# Patient Record
Sex: MALE | Race: WHITE | Employment: OTHER | URBAN - METROPOLITAN AREA
[De-identification: names, ages, dates, MRNs, and addresses within clinical notes are randomized per-mention and may not be internally consistent; named-entity substitution may affect disease eponyms.]

---

## 2020-09-14 ENCOUNTER — ANCILLARY PROCEDURE (OUTPATIENT)
Dept: GENERAL RADIOLOGY | Facility: CLINIC | Age: 65
End: 2020-09-14
Attending: PEDIATRICS
Payer: COMMERCIAL

## 2020-09-14 ENCOUNTER — OFFICE VISIT (OUTPATIENT)
Dept: ORTHOPEDICS | Facility: CLINIC | Age: 65
End: 2020-09-14
Payer: COMMERCIAL

## 2020-09-14 VITALS
WEIGHT: 196 LBS | BODY MASS INDEX: 28.06 KG/M2 | HEIGHT: 70 IN | SYSTOLIC BLOOD PRESSURE: 138 MMHG | DIASTOLIC BLOOD PRESSURE: 68 MMHG

## 2020-09-14 DIAGNOSIS — M77.01 MEDIAL EPICONDYLITIS OF RIGHT ELBOW: ICD-10-CM

## 2020-09-14 DIAGNOSIS — M25.521 RIGHT ELBOW PAIN: ICD-10-CM

## 2020-09-14 DIAGNOSIS — M19.021 PRIMARY OSTEOARTHRITIS OF RIGHT ELBOW: ICD-10-CM

## 2020-09-14 DIAGNOSIS — M25.521 RIGHT ELBOW PAIN: Primary | ICD-10-CM

## 2020-09-14 PROCEDURE — 73080 X-RAY EXAM OF ELBOW: CPT | Mod: RT

## 2020-09-14 PROCEDURE — 99204 OFFICE O/P NEW MOD 45 MIN: CPT | Performed by: PEDIATRICS

## 2020-09-14 RX ORDER — SIMVASTATIN 10 MG
10 TABLET ORAL AT BEDTIME
COMMUNITY

## 2020-09-14 RX ORDER — LISINOPRIL 10 MG/1
10 TABLET ORAL DAILY
COMMUNITY

## 2020-09-14 ASSESSMENT — MIFFLIN-ST. JEOR: SCORE: 1675.3

## 2020-09-14 NOTE — PROGRESS NOTES
Sports Medicine Clinic Visit    PCP: Andrei Callejas Di is a 64 year old male who is seen  as a self referral presenting with right medial elbow pain.  Pain has been present for 4-5 months and pain has been increasing.  Pain increased with racquet and pickle ball.    Pain increased last night.  Pain even with rolling over in bed.  This morning, it has improved.    Did arm wrestle a week prior to this.    He is right hand dominant.    Has noticed numbness in his small finger     Injury: gradual onset  **  A lot of racquet sports in past. Also physical labor.  Pain medial elbow, points at medial epicondyle.        Location of Pain: right medial elbow   Duration of Pain: 4-5 month(s)  Rating of Pain at worst: 5/10  Rating of Pain Currently: 0/10  Symptoms are better with: Rest  Symptoms are worse with: racquet sports   Additional Features:   Positive: paresthesias and numbness   Negative: swelling, bruising, popping, grinding, catching, locking, instability, weakness, pain in other joints and systemic symptoms  Other evaluation and/or treatments so far consists of: Nothing  Prior History of related problems: denies     Social History: retired, former assembly     Review of Systems  Musculoskeletal: as above  Remainder of review of systems is negative including constitutional, CV, pulmonary, GI, Skin and Neurologic except as noted in HPI or medical history.    Past Medical History:   Diagnosis Date     Amyloidosis (H)      Hyperlipidemia      Hypertension      Past Surgical History:   Procedure Laterality Date     BMT CELL PRODUCT INFUSION       Fam hx: noncontributory    Social History     Socioeconomic History     Marital status:      Spouse name: Not on file     Number of children: Not on file     Years of education: Not on file     Highest education level: Not on file   Occupational History     Not on file   Social Needs     Financial resource strain: Not on file     Food insecurity     " Worry: Not on file     Inability: Not on file     Transportation needs     Medical: Not on file     Non-medical: Not on file   Tobacco Use     Smoking status: Current Some Day Smoker     Smokeless tobacco: Never Used   Substance and Sexual Activity     Alcohol use: Not on file     Drug use: Not on file     Sexual activity: Not on file   Lifestyle     Physical activity     Days per week: Not on file     Minutes per session: Not on file     Stress: Not on file   Relationships     Social connections     Talks on phone: Not on file     Gets together: Not on file     Attends Jain service: Not on file     Active member of club or organization: Not on file     Attends meetings of clubs or organizations: Not on file     Relationship status: Not on file     Intimate partner violence     Fear of current or ex partner: Not on file     Emotionally abused: Not on file     Physically abused: Not on file     Forced sexual activity: Not on file   Other Topics Concern     Not on file   Social History Narrative     Not on file       Objective  /68   Ht 1.778 m (5' 10\")   Wt 88.9 kg (196 lb)   BMI 28.12 kg/m      GENERAL APPEARANCE: healthy, alert and no distress   GAIT: NORMAL  SKIN: no suspicious lesions or rashes  NEURO: Normal strength and tone, mentation intact and speech normal  PSYCH:  mentation appears normal and affect normal/bright  HEENT: no scleral icterus  CV: distal perfusion intact  RESP: nonlabored breathing      Right Elbow exam:    Inspection:       no ecchymosis       no edema or effusion    ROM:       flexion lacking 5-10 deg       extension lacking 5-10 deg       forearm supination full       forearm pronation full    Strength:       flexion        extension        forearm supination        forearm pronation   Grossly full, min pain with pronation    Tender:       Mild medial elbow tendon    Non-Tender:       remainder of elbow area       lateral epicondyle       medial epicondyle       radial " head       olecranon       antecubital space    Sensation:  Grossly intact light touch      Skin:       well perfused       capillary refill brisk      Tinel negative medial elbow.      Radiology:  Visualized radiographs as noted below, and reviewed the images with the patient; if the report was available at the time of the visit, the report was reviewed as well.  Arthrosis present at elbow.      Recent Results (from the past 24 hour(s))   XR Elbow Right G/E 3 Views    Narrative    RIGHT ELBOW THREE OR MORE VIEWS   9/14/2020 2:23 PM     HISTORY: Right elbow pain.    COMPARISON: None.      Impression    IMPRESSION: No definite elevated anterior fat pad. Mild-to-moderate  ulnotrochlear degenerative changes. Large olecranon spur. No definite  fracture.    MIGUEL ANDERSON MD         Assessment:  1. Right elbow pain    2. Primary osteoarthritis of right elbow    3. Medial epicondylitis of right elbow        Plan:  Discussed the assessment with the patient.  See patient instructions. Start with therapy.  Follow up: 1 month if not improving, sooner prn. Future consideration may also include intra-articular elbow joint injection.  Questions answered. Discussed signs and symptoms that may indicate more serious issues; the patient was instructed to seek appropriate care if noted. Leif indicates understanding of these issues and agrees with the plan.      Landry Shields, DO, CAQ            Patient Instructions   The x-rays show some degenerative change in the elbow, but the location of pain and the exam findings, along with history for this right arm, fit best with medial epicondylitis, also called golfers elbow.  For this, may do icing, heat, over the counter medication as needed.  Modify activities for pain.  Discussed rehab approach; plan therapy next. Referral placed for therapy. Ok to start with 1-2 visits, with additional per therapist recommendation.  Discussed potential use of compression for comfort with  activities, and consider use of a counterforce brace (forearm strap/band).  Monitor course next 1 month with therapy, and contact clinic if not improving, or if any questions/concerns.      If you have any further questions for your physician or physician s care team you can call [phone] and use option 3 to leave a voice message. Calls received during business hours will be returned same day.          This note consists of symbols derived from keyboarding, dictation and/or voice recognition software. As a result, there may be errors in the script that have gone undetected. Please consider this when interpreting information found in this chart.

## 2020-09-14 NOTE — PATIENT INSTRUCTIONS
The x-rays show some degenerative change in the elbow, but the location of pain and the exam findings, along with history for this right arm, fit best with medial epicondylitis, also called golfers elbow.  For this, may do icing, heat, over the counter medication as needed.  Modify activities for pain.  Discussed rehab approach; plan therapy next. Referral placed for therapy. Ok to start with 1-2 visits, with additional per therapist recommendation.  Discussed potential use of compression for comfort with activities, and consider use of a counterforce brace (forearm strap/band).  Monitor course next 1 month with therapy, and contact clinic if not improving, or if any questions/concerns.      If you have any further questions for your physician or physician s care team you can call [phone] and use option 3 to leave a voice message. Calls received during business hours will be returned same day.

## 2020-09-14 NOTE — LETTER
9/14/2020         RE: Leif Sevilla  [address] [place] [address]        Dear Colleague,    Thank you for referring your patient, Leif Sevilla, to the [facility]. Please see a copy of my visit note below.    Sports Medicine Clinic Visit    PCP: Andrei Callejas    Leif Sevilla is a 64 year old male who is seen  as a self referral presenting with right medial elbow pain.  Pain has been present for 4-5 months and pain has been increasing.  Pain increased with racquet and pickle ball.    Pain increased last night.  Pain even with rolling over in bed.  This morning, it has improved.    Did arm wrestle a week prior to this.    He is right hand dominant.    Has noticed numbness in his small finger     Injury: gradual onset  **  A lot of racquet sports in past. Also physical labor.  Pain medial elbow, points at medial epicondyle.        Location of Pain: right medial elbow   Duration of Pain: 4-5 month(s)  Rating of Pain at worst: 5/10  Rating of Pain Currently: 0/10  Symptoms are better with: Rest  Symptoms are worse with: racquet sports   Additional Features:   Positive: paresthesias and numbness   Negative: swelling, bruising, popping, grinding, catching, locking, instability, weakness, pain in other joints and systemic symptoms  Other evaluation and/or treatments so far consists of: Nothing  Prior History of related problems: denies     Social History: retired, former assembly     Review of Systems  Musculoskeletal: as above  Remainder of review of systems is negative including constitutional, CV, pulmonary, GI, Skin and Neurologic except as noted in HPI or medical history.    Past Medical History:   Diagnosis Date     Amyloidosis (H)      Hyperlipidemia      Hypertension      Past Surgical History:   Procedure Laterality Date     BMT CELL PRODUCT INFUSION       Fam hx: noncontributory    Social History     Socioeconomic History      "Marital status:      Spouse name: Not on file     Number of children: Not on file     Years of education: Not on file     Highest education level: Not on file   Occupational History     Not on file   Social Needs     Financial resource strain: Not on file     Food insecurity     Worry: Not on file     Inability: Not on file     Transportation needs     Medical: Not on file     Non-medical: Not on file   Tobacco Use     Smoking status: Current Some Day Smoker     Smokeless tobacco: Never Used   Substance and Sexual Activity     Alcohol use: Not on file     Drug use: Not on file     Sexual activity: Not on file   Lifestyle     Physical activity     Days per week: Not on file     Minutes per session: Not on file     Stress: Not on file   Relationships     Social connections     Talks on phone: Not on file     Gets together: Not on file     Attends [employer] service: Not on file     Active member of club or organization: Not on file     Attends meetings of clubs or organizations: Not on file     Relationship status: Not on file     Intimate partner violence     Fear of current or ex partner: Not on file     Emotionally abused: Not on file     Physically abused: Not on file     Forced sexual activity: Not on file   Other Topics Concern     Not on file   Social History Narrative     Not on file       Objective  /68   Ht 1.778 m (5' 10\")   Wt 88.9 kg (196 lb)   BMI 28.12 kg/m      GENERAL APPEARANCE: healthy, alert and no distress   GAIT: NORMAL  SKIN: no suspicious lesions or rashes  NEURO: Normal strength and tone, mentation intact and speech normal  PSYCH:  mentation appears normal and affect normal/bright  HEENT: no scleral icterus  CV: distal perfusion intact  RESP: nonlabored breathing      Right Elbow exam:    Inspection:       no ecchymosis       no edema or effusion    ROM:       flexion lacking 5-10 deg       extension lacking 5-10 deg       forearm supination full       forearm pronation " full    Strength:       flexion        extension        forearm supination        forearm pronation   Grossly full, min pain with pronation    Tender:       Mild medial elbow tendon    Non-Tender:       remainder of elbow area       lateral epicondyle       medial epicondyle       radial head       olecranon       antecubital space    Sensation:  Grossly intact light touch      Skin:       well perfused       capillary refill brisk      Tinel negative medial elbow.      Radiology:  Visualized radiographs as noted below, and reviewed the images with the patient; if the report was available at the time of the visit, the report was reviewed as well.  Arthrosis present at elbow.      Recent Results (from the past 24 hour(s))   XR Elbow Right G/E 3 Views    Narrative    RIGHT ELBOW THREE OR MORE VIEWS   9/14/2020 2:23 PM     HISTORY: Right elbow pain.    COMPARISON: None.      Impression    IMPRESSION: No definite elevated anterior fat pad. Mild-to-moderate  ulnotrochlear degenerative changes. Large olecranon spur. No definite  fracture.    MIGUEL ANDERSON MD         Assessment:  1. Right elbow pain    2. Primary osteoarthritis of right elbow    3. Medial epicondylitis of right elbow        Plan:  Discussed the assessment with the patient.  See patient instructions. Start with therapy.  Follow up: 1 month if not improving, sooner prn. Future consideration may also include intra-articular elbow joint injection.  Questions answered. Discussed signs and symptoms that may indicate more serious issues; the patient was instructed to seek appropriate care if noted. Leif indicates understanding of these issues and agrees with the plan.      Landry Shields, DO, CAQ            Patient Instructions   The x-rays show some degenerative change in the elbow, but the location of pain and the exam findings, along with history for this right arm, fit best with medial epicondylitis, also called golfers elbow.  For this, may do icing,  heat, over the counter medication as needed.  Modify activities for pain.  Discussed rehab approach; plan therapy next. Referral placed for therapy. Ok to start with 1-2 visits, with additional per therapist recommendation.  Discussed potential use of compression for comfort with activities, and consider use of a counterforce brace (forearm strap/band).  Monitor course next 1 month with therapy, and contact clinic if not improving, or if any questions/concerns.      If you have any further questions for your physician or physician s care team you can call [phone] and use option 3 to leave a voice message. Calls received during business hours will be returned same day.          This note consists of symbols derived from keyboarding, dictation and/or voice recognition software. As a result, there may be errors in the script that have gone undetected. Please consider this when interpreting information found in this chart.        Again, thank you for allowing me to participate in the care of your patient.        Sincerely,        Landry Shields, DO

## 2020-09-15 ENCOUNTER — THERAPY VISIT (OUTPATIENT)
Dept: PHYSICAL THERAPY | Facility: CLINIC | Age: 65
End: 2020-09-15
Attending: PEDIATRICS
Payer: COMMERCIAL

## 2020-09-15 DIAGNOSIS — M25.521 RIGHT ELBOW PAIN: ICD-10-CM

## 2020-09-15 PROCEDURE — 97110 THERAPEUTIC EXERCISES: CPT | Mod: GP | Performed by: PHYSICAL THERAPIST

## 2020-09-15 PROCEDURE — 97161 PT EVAL LOW COMPLEX 20 MIN: CPT | Mod: GP | Performed by: PHYSICAL THERAPIST

## 2020-09-15 NOTE — PROGRESS NOTES
[facility] Initial Evaluation  Subjective:  The history is provided by the patient. No  was used.   Patient Health History  Leif Sevilla being seen for Right Elbow Pain.     Problem began: 3/15/2020.   Problem occurred: playing more games of raquetball and pickleball   Pain is reported as 1/10 on pain scale.  General health as reported by patient is excellent.  Health conditions: smoking, pain at night/rest, high BP, hx of fractures, numbness/tingling.   Red flags:  Pain at rest/night.  Medical allergies: none.    Other surgery history details: stem cell transplant (bone marrow), Intestinal surgery as as child.    Current medications: high BP.    Current occupation is Retired.                     Therapist Generated HPI Evaluation         Type of problem:  Right elbow.    This is a new condition.  Condition occurred with:  Repetition/overuse (possibly due to playing more pickle ball).  Where condition occurred: for unknown reasons.  Site of Pain: previously shoulder a few months ago, now only in medial elbow.  Pain is described as sharp     Since onset symptoms are gradually worsening (worse after arm wrestling this past weekend).  Associated symptoms:  Loss of motion/stiffness and numbness (occasional numbness in the pink finger and laterl 3 fingers). Symptoms are exacerbated by certain positions (moving it at night, after playing sports)        Barriers include:  None as reported by patient.                        Objective:  System              Cervical/Thoracic Evaluation    AROM:  AROM Cervical:    Flexion:            Min loss, pain free  Extension:       Mod loss, pain free  Rotation:         Left: mod loss, pain free     Right: mod loss, painful  Side Bend:      Left: mod loss, pain free     Right:  Mod loss, pain free        Cervical Myotomes:  normal                                       Shoulder Evaluation:  ROM:  AROM:    Flexion:  Left:  145    Right:   135    Abduction:  Left: 140   Right:  130      External Rotation:  Left:  50    Right:  45                      Strength:            Internal Rotation:  Left:4+/5   Strong/pain free    Pain:    Right: 4/5   Strong/painful    Pain:  External Rotation:   Left:4+/5   Strong/pain free    Pain:   Right:4/5   Strong/painful    Pain:                       ROM:  AROM:  normal  Hyperextension Elbow: Left:  5*    Flexion Elbow:  Left:130   Right:120  Extension Elbow:  Left: 0*    Right: lacking 20*      Supination Elbow/Wrist:  Left: 75Right: 80  Pronation Elbow/Wrist:  Left: 85    Right: 85  Radial Deviation:  Left: WNL   Right: WNL  Ulnar Deviation:  Right: WNL    Left: WNL    Pain: no pain with elbow flexion or extension    :  Dominance: Right   Left: 43 kg      Right: 45 kg    Special Testing:    Left wrist/elbow negative for the following special tests:   Lateral Epicondylitis or Medial Epicondylitis  Right wrist/elbow positive for the following special tests: Medial EpicondylitisRight wrist/elbow negative for the following special tests: Lateral Epicondylitis                                  General     ROS    Assessment/Plan:    Patient is a 64 year old male with cervical, right side shoulder and right side elbow complaints.    Patient has the following significant findings with corresponding treatment plan.                Diagnosis 1:  Right Elbow Pain secondary to RC strain  Pain -  manual therapy, self management, education and home program  Decreased ROM/flexibility - manual therapy and therapeutic exercise  Decreased strength - therapeutic exercise and therapeutic activities  Impaired posture - neuro re-education    Therapy Evaluation Codes:   1) History comprised of:   Personal factors that impact the plan of care:      Time since onset of symptoms.    Comorbidity factors that impact the plan of care are:      None.     Medications impacting care: None.  2) Examination of Body Systems comprised  of:   Body structures and functions that impact the plan of care:      Cervical spine, Elbow and Shoulder.   Activity limitations that impact the plan of care are:      reaching out to the side, lifting.  3) Clinical presentation characteristics are:   Stable/Uncomplicated.  4) Decision-Making    Low complexity using standardized patient assessment instrument and/or measureable assessment of functional outcome.  Cumulative Therapy Evaluation is: Low complexity.    Previous and current functional limitations:  (See Goal Flow Sheet for this information)    Short term and Long term goals: (See Goal Flow Sheet for this information)     Communication ability:  Patient appears to be able to clearly communicate and understand verbal and written communication and follow directions correctly.  Treatment Explanation - The following has been discussed with the patient:   RX ordered/plan of care  Anticipated outcomes  Possible risks and side effects  This patient would benefit from PT intervention to resume normal activities.   Rehab potential is excellent.    Frequency:  1 X week, once daily  Duration:  for 6 weeks  Discharge Plan:  Achieve all LTG.  Independent in home treatment program.  Reach maximal therapeutic benefit.    Please refer to the daily flowsheet for treatment today, total treatment time and time spent performing 1:1 timed codes.

## 2020-09-21 ENCOUNTER — THERAPY VISIT (OUTPATIENT)
Dept: PHYSICAL THERAPY | Facility: CLINIC | Age: 65
End: 2020-09-21
Payer: COMMERCIAL

## 2020-09-21 DIAGNOSIS — M25.521 RIGHT ELBOW PAIN: ICD-10-CM

## 2020-09-21 PROCEDURE — 97110 THERAPEUTIC EXERCISES: CPT | Mod: GP | Performed by: PHYSICAL THERAPIST

## 2020-09-21 PROCEDURE — 97112 NEUROMUSCULAR REEDUCATION: CPT | Mod: GP | Performed by: PHYSICAL THERAPIST

## 2020-09-28 ENCOUNTER — THERAPY VISIT (OUTPATIENT)
Dept: PHYSICAL THERAPY | Facility: CLINIC | Age: 65
End: 2020-09-28
Payer: COMMERCIAL

## 2020-09-28 DIAGNOSIS — M25.521 RIGHT ELBOW PAIN: ICD-10-CM

## 2020-09-28 PROCEDURE — 97112 NEUROMUSCULAR REEDUCATION: CPT | Mod: GP | Performed by: PHYSICAL THERAPIST

## 2020-09-28 PROCEDURE — 97110 THERAPEUTIC EXERCISES: CPT | Mod: GP | Performed by: PHYSICAL THERAPIST

## 2020-09-28 NOTE — PROGRESS NOTES
SUBJECTIVE  Subjective: Elbow feels great, pretty much no pain there anymore.  Shoulder pain has resolved completely.  Loves the exercises   Current pain level: 1/10    Changes in function:  Yes (See Goal flowsheet attached for changes in current functional level)     Adverse reaction to treatment or activity:  None    OBJECTIVE  Objective: MMT R shoulder flex and ABD: 4+/5.  Firm end feel of R elbow extension (likely very chronic)     ASSESSMENT  Leif continues to require intervention to meet STG and LTG's: PT  Patient is progressing as expected.  Response to therapy has shown an improvement in  pain level and strength  Progress made towards STG/LTG?  Yes (See Goal flowsheet attached for updates on achievement of STG and LTG)    PLAN  Current treatment program is being advanced to more complex exercises.    PTA/ATC plan:  N/A    Please refer to the daily flowsheet for treatment today, total treatment time and time spent performing 1:1 timed codes.

## 2020-10-12 ENCOUNTER — THERAPY VISIT (OUTPATIENT)
Dept: PHYSICAL THERAPY | Facility: CLINIC | Age: 65
End: 2020-10-12
Payer: COMMERCIAL

## 2020-10-12 DIAGNOSIS — M25.521 RIGHT ELBOW PAIN: ICD-10-CM

## 2020-10-12 PROCEDURE — 97110 THERAPEUTIC EXERCISES: CPT | Mod: GP | Performed by: PHYSICAL THERAPIST

## 2020-10-12 NOTE — PROGRESS NOTES
Subjective:  HPI  Physical Exam                    Objective:  System    Physical Exam    General     ROS    Assessment/Plan:    PROGRESS  REPORT    Progress reporting period is from 10/12/2020.       SUBJECTIVE  I have been using the exercise with the stick.  I feel it moves further.  I do not know if there is much more than I can do.  I did hurt my back yesterday but it feels better today.  I think I can independently manage what is need at this point.      Current pain level is 0/10  .     Changes in function:  Yes (See Goal flowsheet attached for changes in current functional level)  Adverse reaction to treatment or activity: None    OBJECTIVE  Changes noted in objective findings:  The objective findings below are from DOS 10/12/2020.    Pt demonstrates and verbalizes independence with HEP.  Stretching modified to doorway, side plank integration for UE CKC and core strengthening.  To be discharged at this time.      ASSESSMENT/PLAN  Updated problem list and treatment plan: Diagnosis 1:  R shoulder, elbow and neck pain    Pain -  home program  Decreased ROM/flexibility - manual therapy and therapeutic exercise  Decreased joint mobility - manual therapy and therapeutic exercise  Decreased strength - therapeutic exercise and therapeutic activities  STG/LTGs have been met or progress has been made towards goals:  Yes (See Goal flow sheet completed today.)  Assessment of Progress: The patient's condition is improving.  Self Management Plans:  Patient is independent in a home treatment program.  Patient is independent in self management of symptoms.  I have re-evaluated this patient and find that the nature, scope, duration and intensity of the therapy is appropriate for the medical condition of the patient.  Leif continues to require the following intervention to meet STG and LTG's:  PT intervention is no longer required to meet STG/LTG.    Recommendations:  This patient is ready to be discharged from therapy and  continue their home treatment program.    Please refer to the daily flowsheet for treatment today, total treatment time and time spent performing 1:1 timed codes.

## 2020-12-13 ENCOUNTER — HEALTH MAINTENANCE LETTER (OUTPATIENT)
Age: 65
End: 2020-12-13

## 2021-09-26 ENCOUNTER — HEALTH MAINTENANCE LETTER (OUTPATIENT)
Age: 66
End: 2021-09-26

## 2021-10-12 ENCOUNTER — ANCILLARY PROCEDURE (OUTPATIENT)
Dept: GENERAL RADIOLOGY | Facility: CLINIC | Age: 66
End: 2021-10-12
Attending: FAMILY MEDICINE
Payer: COMMERCIAL

## 2021-10-12 ENCOUNTER — OFFICE VISIT (OUTPATIENT)
Dept: ORTHOPEDICS | Facility: CLINIC | Age: 66
End: 2021-10-12
Payer: COMMERCIAL

## 2021-10-12 VITALS — DIASTOLIC BLOOD PRESSURE: 64 MMHG | HEIGHT: 70 IN | BODY MASS INDEX: 28.12 KG/M2 | SYSTOLIC BLOOD PRESSURE: 110 MMHG

## 2021-10-12 DIAGNOSIS — M25.561 ACUTE PAIN OF BOTH KNEES: Primary | ICD-10-CM

## 2021-10-12 DIAGNOSIS — M25.561 BILATERAL KNEE PAIN: ICD-10-CM

## 2021-10-12 DIAGNOSIS — M25.562 ACUTE PAIN OF BOTH KNEES: Primary | ICD-10-CM

## 2021-10-12 DIAGNOSIS — M25.562 BILATERAL KNEE PAIN: ICD-10-CM

## 2021-10-12 PROCEDURE — 99203 OFFICE O/P NEW LOW 30 MIN: CPT | Performed by: FAMILY MEDICINE

## 2021-10-12 PROCEDURE — 73562 X-RAY EXAM OF KNEE 3: CPT | Mod: RT | Performed by: RADIOLOGY

## 2021-10-12 NOTE — LETTER
10/12/2021         RE: Leif Sevilla  [address] [facility] [place] [address]        Dear Colleague,    Thank you for referring your patient, Leif Sevilla, to the [facility]. Please see a copy of my visit note below.    ASSESSMENT & PLAN    Leif was seen today for pain and pain.    Diagnoses and all orders for this visit:    Acute pain of both knees  -     XR Knee Standing Bilateral 3 Views; Future      This issue is acute and Improving.    # Bilateral Knee Discomfort: Notable over the past 6 weeks with some painful catching but no significant pain today.  There is mild tenderness to palpation over medial knee jointlines.  X-ray today showing mild knee arthritis likely contributing to his symptoms.  Counseled patient on nature of condition and treatment options.  Given this plan as below, follow-up as needed   Image Findings: mild medial knee arthritis   Treatment: Activities as tolerated, home exercises given today  Medications/Injections: Limited tylenol/ibuprofen for pain for 1-2 weeks, defer steroid injections for now  Follow-up: In one month if symptoms do not improve, sooner if worsening  Can consider steroid injections if pain flares    Deven Sales MD  [facility]    -----  Chief Complaint   Patient presents with     Right Knee - Pain     Left Knee - Pain       SUBJECTIVE  Leif Sevilla is a/an 66 year old male who is seen as a self referral for evaluation of bilateral knee pain.     The patient is seen by themselves.  Onset: 6 week(s) ago. Reports insidious onset without acute precipitating event.  Patient is active with pickleball, bike riding, racquetball.   Doesn't affect sleep.    Location of Pain: bilateral anterior knee  Worsened by: getting out of bed, stepping   Better with: activity   Treatments tried: no treatment tried to date  Associated symptoms: feeling of  "instability    Orthopedic/Surgical history: NO  Social History/Occupation: racquet ball     No family history pertinent to patient's problem today.     REVIEW OF SYSTEMS:  Review of Systems  Constitutional, HEENT, cardiovascular, pulmonary, GI, , musculoskeletal, neuro, skin, endocrine and psych systems are negative, except as otherwise noted.    OBJECTIVE:  /64   Ht 1.778 m (5' 10\")   BMI 28.12 kg/m     General: healthy, alert and in no distress  HEENT: no scleral icterus or conjunctival erythema  Skin: no suspicious lesions or rash. No jaundice.  CV: distal perfusion intact    Resp: normal respiratory effort without conversational dyspnea   Psych: normal mood and affect  Gait: normal steady gait with appropriate coordination and balance    Neuro: Normal light sensory exam of bilateral lower extremities    BILATERAL KNEE  Inspection:    Normal alignment; no edema, erythema, or ecchymosis present  Palpation:    Tender about the lateral patellar facet, medial patellar facet and medial joint line. Remainder of bony and ligamentous landmarks are nontender.    No effusion is present    Patellofemoral crepitus is Absent  Range of Motion:      00 extension to 1350 flexion  Strength:    Quadriceps 5/5, hamstrings 5/5, gastrocsoleus 5/5 and tibialis anterior 5/5    Extensor mechanism intact  Special Tests:    Positive: None    Negative: Patellar grind, MCL/valgus stress (0 & 30 deg), LCL/varus stress (0 & 30 deg), Lachman's, anterior drawer, posterior drawer, Kwan's       RADIOLOGY:  I independently ordered, visualized and reviewed these images with the patient  Mild knee arthritis no other significant arthritis     KNEE STANDING BILATERAL THREE VIEWS October 12, 2021 10:50 AM      HISTORY: Bilateral knee pain.     COMPARISON: None.                                                                      IMPRESSION:   1. I cannot exclude small loose bodies posteriorly.  2. The degree of medial and lateral " compartment narrowing is not well  evaluated, due to obliquity.  3. No fracture identified.   Review of the result(s) of each unique test - bilateral knee x-rays  56}           Again, thank you for allowing me to participate in the care of your patient.        Sincerely,        Deven Sales MD

## 2021-10-12 NOTE — PROGRESS NOTES
ASSESSMENT & PLAN    Leif was seen today for pain and pain.    Diagnoses and all orders for this visit:    Acute pain of both knees  -     XR Knee Standing Bilateral 3 Views; Future      This issue is acute and Improving.    # Bilateral Knee Discomfort: Notable over the past 6 weeks with some painful catching but no significant pain today.  There is mild tenderness to palpation over medial knee jointlines.  X-ray today showing mild knee arthritis likely contributing to his symptoms.  Counseled patient on nature of condition and treatment options.  Given this plan as below, follow-up as needed   Image Findings: mild medial knee arthritis   Treatment: Activities as tolerated, home exercises given today  Medications/Injections: Limited tylenol/ibuprofen for pain for 1-2 weeks, defer steroid injections for now  Follow-up: In one month if symptoms do not improve, sooner if worsening  Can consider steroid injections if pain flares    Deven Sales MD  [facility]    -----  Chief Complaint   Patient presents with     Right Knee - Pain     Left Knee - Pain       SUBJECTIVE  Leif Sevilla is a/an 66 year old male who is seen as a self referral for evaluation of bilateral knee pain.     The patient is seen by themselves.  Onset: 6 week(s) ago. Reports insidious onset without acute precipitating event.  Patient is active with pickleball, bike riding, racquetball.   Doesn't affect sleep.    Location of Pain: bilateral anterior knee  Worsened by: getting out of bed, stepping   Better with: activity   Treatments tried: no treatment tried to date  Associated symptoms: feeling of instability    Orthopedic/Surgical history: NO  Social History/Occupation: racquet ball     No family history pertinent to patient's problem today.     REVIEW OF SYSTEMS:  Review of Systems  Constitutional, HEENT, cardiovascular, pulmonary, GI, , musculoskeletal, neuro, skin, endocrine and psych  "systems are negative, except as otherwise noted.    OBJECTIVE:  /64   Ht 1.778 m (5' 10\")   BMI 28.12 kg/m     General: healthy, alert and in no distress  HEENT: no scleral icterus or conjunctival erythema  Skin: no suspicious lesions or rash. No jaundice.  CV: distal perfusion intact    Resp: normal respiratory effort without conversational dyspnea   Psych: normal mood and affect  Gait: normal steady gait with appropriate coordination and balance    Neuro: Normal light sensory exam of bilateral lower extremities    BILATERAL KNEE  Inspection:    Normal alignment; no edema, erythema, or ecchymosis present  Palpation:    Tender about the lateral patellar facet, medial patellar facet and medial joint line. Remainder of bony and ligamentous landmarks are nontender.    No effusion is present    Patellofemoral crepitus is Absent  Range of Motion:      00 extension to 1350 flexion  Strength:    Quadriceps 5/5, hamstrings 5/5, gastrocsoleus 5/5 and tibialis anterior 5/5    Extensor mechanism intact  Special Tests:    Positive: None    Negative: Patellar grind, MCL/valgus stress (0 & 30 deg), LCL/varus stress (0 & 30 deg), Lachman's, anterior drawer, posterior drawer, Kwan's       RADIOLOGY:  I independently ordered, visualized and reviewed these images with the patient  Mild knee arthritis no other significant arthritis     KNEE STANDING BILATERAL THREE VIEWS October 12, 2021 10:50 AM      HISTORY: Bilateral knee pain.     COMPARISON: None.                                                                      IMPRESSION:   1. I cannot exclude small loose bodies posteriorly.  2. The degree of medial and lateral compartment narrowing is not well  evaluated, due to obliquity.  3. No fracture identified.   Review of the result(s) of each unique test - bilateral knee x-rays  56}       "

## 2021-10-12 NOTE — PATIENT INSTRUCTIONS
# Bilateral Knee Discomfort: Notable over the past 6 weeks with some painful catching but no significant pain today.  There is mild tenderness to palpation over medial knee jointlines.  X-ray today showing mild knee arthritis likely contributing to his symptoms.  Counseled patient on nature of condition and treatment options.  Given this plan as below, follow-up as needed   Image Findings: mild medial knee arthritis   Treatment: Activities as tolerated, home exercises given today  Medications/Injections: Limited tylenol/ibuprofen for pain for 1-2 weeks, defer steroid injections for now  Follow-up: In one month if symptoms do not improve, sooner if worsening  Can consider steroid injections if pain flares    Please call [phone]   Ask for my team if you have any questions or concerns    It was great seeing you today!    Deven Sales MD, CAQSM

## 2022-01-16 ENCOUNTER — HEALTH MAINTENANCE LETTER (OUTPATIENT)
Age: 67
End: 2022-01-16

## 2023-04-23 ENCOUNTER — HEALTH MAINTENANCE LETTER (OUTPATIENT)
Age: 68
End: 2023-04-23

## 2023-12-03 ENCOUNTER — HEALTH MAINTENANCE LETTER (OUTPATIENT)
Age: 68
End: 2023-12-03